# Patient Record
Sex: MALE | Race: WHITE
[De-identification: names, ages, dates, MRNs, and addresses within clinical notes are randomized per-mention and may not be internally consistent; named-entity substitution may affect disease eponyms.]

---

## 2019-03-19 ENCOUNTER — HOSPITAL ENCOUNTER (EMERGENCY)
Dept: HOSPITAL 62 - ER | Age: 49
Discharge: HOME | End: 2019-03-19
Payer: SELF-PAY

## 2019-03-19 VITALS — DIASTOLIC BLOOD PRESSURE: 87 MMHG | SYSTOLIC BLOOD PRESSURE: 186 MMHG

## 2019-03-19 DIAGNOSIS — R10.13: Primary | ICD-10-CM

## 2019-03-19 DIAGNOSIS — I49.1: ICD-10-CM

## 2019-03-19 DIAGNOSIS — D72.828: ICD-10-CM

## 2019-03-19 DIAGNOSIS — E11.65: ICD-10-CM

## 2019-03-19 DIAGNOSIS — R20.2: ICD-10-CM

## 2019-03-19 DIAGNOSIS — R10.811: ICD-10-CM

## 2019-03-19 DIAGNOSIS — R10.11: ICD-10-CM

## 2019-03-19 DIAGNOSIS — I10: ICD-10-CM

## 2019-03-19 DIAGNOSIS — R10.816: ICD-10-CM

## 2019-03-19 DIAGNOSIS — Z79.899: ICD-10-CM

## 2019-03-19 LAB
ADD MANUAL DIFF: NO
ALBUMIN SERPL-MCNC: 4.1 G/DL (ref 3.5–5)
ALP SERPL-CCNC: 68 U/L (ref 38–126)
ALT SERPL-CCNC: 27 U/L (ref 21–72)
ANION GAP SERPL CALC-SCNC: 11 MMOL/L (ref 5–19)
AST SERPL-CCNC: 21 U/L (ref 17–59)
BASOPHILS # BLD AUTO: 0.1 10^3/UL (ref 0–0.2)
BASOPHILS NFR BLD AUTO: 0.9 % (ref 0–2)
BILIRUB DIRECT SERPL-MCNC: 0.3 MG/DL (ref 0–0.4)
BILIRUB SERPL-MCNC: 0.7 MG/DL (ref 0.2–1.3)
BUN SERPL-MCNC: 20 MG/DL (ref 7–20)
CALCIUM: 10.1 MG/DL (ref 8.4–10.2)
CHLORIDE SERPL-SCNC: 99 MMOL/L (ref 98–107)
CO2 SERPL-SCNC: 27 MMOL/L (ref 22–30)
EOSINOPHIL # BLD AUTO: 0.2 10^3/UL (ref 0–0.6)
EOSINOPHIL NFR BLD AUTO: 1.7 % (ref 0–6)
ERYTHROCYTE [DISTWIDTH] IN BLOOD BY AUTOMATED COUNT: 15 % (ref 11.5–14)
GLUCOSE SERPL-MCNC: 171 MG/DL (ref 75–110)
HCT VFR BLD CALC: 40.8 % (ref 37.9–51)
HGB BLD-MCNC: 13.9 G/DL (ref 13.5–17)
LIPASE SERPL-CCNC: 200.6 U/L (ref 23–300)
LYMPHOCYTES # BLD AUTO: 3 10^3/UL (ref 0.5–4.7)
LYMPHOCYTES NFR BLD AUTO: 21.2 % (ref 13–45)
MCH RBC QN AUTO: 28.2 PG (ref 27–33.4)
MCHC RBC AUTO-ENTMCNC: 34 G/DL (ref 32–36)
MCV RBC AUTO: 83 FL (ref 80–97)
MONOCYTES # BLD AUTO: 0.9 10^3/UL (ref 0.1–1.4)
MONOCYTES NFR BLD AUTO: 6.5 % (ref 3–13)
NEUTROPHILS # BLD AUTO: 9.9 10^3/UL (ref 1.7–8.2)
NEUTS SEG NFR BLD AUTO: 69.7 % (ref 42–78)
PLATELET # BLD: 268 10^3/UL (ref 150–450)
POTASSIUM SERPL-SCNC: 4.3 MMOL/L (ref 3.6–5)
PROT SERPL-MCNC: 6.8 G/DL (ref 6.3–8.2)
RBC # BLD AUTO: 4.91 10^6/UL (ref 4.35–5.55)
SODIUM SERPL-SCNC: 136.5 MMOL/L (ref 137–145)
TOTAL CELLS COUNTED % (AUTO): 100 %
WBC # BLD AUTO: 14.2 10^3/UL (ref 4–10.5)

## 2019-03-19 PROCEDURE — S0119 ONDANSETRON 4 MG: HCPCS

## 2019-03-19 PROCEDURE — 71045 X-RAY EXAM CHEST 1 VIEW: CPT

## 2019-03-19 PROCEDURE — 84484 ASSAY OF TROPONIN QUANT: CPT

## 2019-03-19 PROCEDURE — 99284 EMERGENCY DEPT VISIT MOD MDM: CPT

## 2019-03-19 PROCEDURE — 93005 ELECTROCARDIOGRAM TRACING: CPT

## 2019-03-19 PROCEDURE — 36415 COLL VENOUS BLD VENIPUNCTURE: CPT

## 2019-03-19 PROCEDURE — 83690 ASSAY OF LIPASE: CPT

## 2019-03-19 PROCEDURE — 85025 COMPLETE CBC W/AUTO DIFF WBC: CPT

## 2019-03-19 PROCEDURE — 93010 ELECTROCARDIOGRAM REPORT: CPT

## 2019-03-19 PROCEDURE — 76705 ECHO EXAM OF ABDOMEN: CPT

## 2019-03-19 PROCEDURE — 80053 COMPREHEN METABOLIC PANEL: CPT

## 2019-03-19 NOTE — RADIOLOGY REPORT (SQ)
EXAM DESCRIPTION: 



XR CHEST 1 VIEW



COMPLETED DATE/TME:  03/19/2019 05:14



CLINICAL HISTORY: 



48 years, Male, upper abd pain, left arm tingling



Comparison: None 



FINDINGS:

No focal lung consolidation.

No pleural effusion. No pneumothorax.

Cardiac and mediastinal silhouette is unremarkable.

No acute osseous abnormality. 

Soft tissues are unremarkable. 



IMPRESSION:

No acute findings. No focal lung consolidation.

## 2019-03-19 NOTE — ER DOCUMENT REPORT
ED General





- General


TRAVEL OUTSIDE OF THE [place] IN LAST 30 DAYS: No





<SAMANTHA CLANCY - Last Filed: 03/19/19 07:06>





<JAVIER GILLETTE - Last Filed: 03/19/19 09:13>





- General


Chief Complaint: Abdominal Pain


Stated Complaint: POSS FOOD POISONING/BLOOD PRESSURE ISSUES


Time Seen by Provider: 03/19/19 05:07


Notes: 





Patient is a 48-year-old male that comes emergency department for chief co

mplaint of pains in his upper abdomen in the middle and right upper abdomen that

started around 1030 tonight, he states that he also had tingling sensations in 

his left arm, between the 2 he became concerned and came in for evaluation.  He 

denies nausea, vomiting, difficulty breathing, fever/chills.  He states that 

symptoms started shortly after he was eating at [facility] tonight.  Past 

medical history includes type 2 diabetes, hypertension, hyperlipidemia.  He 

denies smoking, recreational drugs, history of MI or CAD. (SAMANTHA CLANCY)





- Related Data


Allergies/Adverse Reactions: 


                                        





No Known Allergies Allergy (Unverified 03/19/19 04:58)


   











Past Medical History





- Social History


Smoking Status: Unknown if Ever Smoked


Family History: Reviewed & Not Pertinent





<JAVIER GILLETTE - Last Filed: 03/19/19 09:13>





- Vital signs


Vitals: 





                                        











Temp Pulse Resp BP Pulse Ox


 


 97.6 F   73   17   189/110 H  94 


 


 03/19/19 05:01  03/19/19 05:01  03/19/19 05:01  03/19/19 05:01  03/19/19 05:01














Course





- Laboratory


Result Diagrams: 


                                 03/19/19 05:37





                                 03/19/19 05:37





<SAMANTHA CLANCY - Last Filed: 03/19/19 07:06>





- Laboratory


Result Diagrams: 


                                 03/19/19 05:37





                                 03/19/19 05:37





<JAVIER GILLETTE - Last Filed: 03/19/19 09:13>





- Re-evaluation


Re-evalutation: 


Chest x-ray is unremarkable.  EKG with no acute findings, patient asymptomatic 

on my initial evaluation.  He is hypertensive but is otherwise well-appearing.  

He identifies his pain as specifically epigastric, he does have some epigastric 

tenderness along with some right upper quadrant tenderness which is minimal, 

however his examination does not show guarding or severe tenderness.  He appears

comfortable.  No tachycardia, fever, hypotension.





CBC does show some mild leukocytosis with elevation of neutrophils but no 

bandemia.  Chemistry unremarkable except for mild hyperglycemia with no 

acidosis.  Lipase is normal.  Ultrasound was performed because of the patient's 

symptoms beginning with eating and because of the painful location, this was 

normal.  Initial troponin is negative.





Discussed with patient.  On reevaluation patient states that he again felt the 

upper abdominal discomfort between my evaluations.  He did not have nausea or 

vomiting, did not have chest pain.  Because of his left arm tingling that 

occurred earlier, along with the epigastric pain, risk factors of age, 

hypertension, diabetes, I recommended a delta troponin despite his atypical 

symptoms.  Also him giving patient Pepcid and Carafate for suspected upper 

gastrointestinal tract source of the symptoms.  Patient states agreement of this

plan, he requests to leave afterwards however if the delta troponin is normal. 

(SAMANTHA CLANCY)





03/19/19 09:11


Patient with no elevation in repeat troponin.  Patient denies any chest pain 

symptoms at this time.  Patient is hypertensive at this time, although has not 

taken his normal daily medication for blood pressure management today.  Good 

return precautions discussed.  The patient has epigastric pain that is not 

suggestive of pulmonary embolus, cardiac ischemia, aortic dissection, or other 

serious etiology.  Given the extremely low risk of these diagnoses for the test 

in evaluation for these possibilities does not appear to be indicated at this 

time.  Patient has been instructed to return if the symptoms worsen or change in

any way. (JAVIER GILLETTE)





- Vital Signs


Vital signs: 





                                        











Temp Pulse Resp BP Pulse Ox


 


 97.6 F   73   20   175/95 H  94 


 


 03/19/19 05:01  03/19/19 05:01  03/19/19 07:36  03/19/19 07:36  03/19/19 07:36














- Laboratory


Laboratory results interpreted by me: 





                                        











  03/19/19 03/19/19





  05:37 05:37


 


WBC  14.2 H 


 


RDW  15.0 H 


 


Absolute Neutrophils  9.9 H 


 


Sodium   136.5 L


 


Glucose   171 H














- EKG Interpretation by Me


Additional EKG results interpreted by me: 


EKG shows sinus rhythm at a rate of 73, no T wave inversions or ST segment 

changes in consecutive leads.  WI interval of 184, QTC of 401.  There is PAC 

noted.  Left axis deviation noted. (SAMANTHA CLANCY)





Discharge





<ASTONSAMANTHA - Last Filed: 03/19/19 07:06>





<JAVIER GILLETTE - Last Filed: 03/19/19 09:13>





- Discharge


Clinical Impression: 


 Epigastric pain, Paresthesia of left arm





Condition: Stable


Disposition: HOME, SELF-CARE


Additional Instructions: 


Your ultrasound, chest x-ray, EKG, and laboratory workup did not show any 

specific concerning findings.


I suspect the cause of your upper abdominal pain is inflammation of the upper 

gastrointestinal tract (gastritis/esophagitis).  I recommend the accident 

Carafate medications as prescribed along with the nausea medication if needed, 

you can take additional Rolaids, Tums, Maalox, etc. if needed.  You can take 

Tylenol for pain. Avoid NSAIDs, alcohol, smoking, caffeine, spicy food.  Start 

with clear fluids, progress to bland diet.  





Your blood pressure was high here in the emergency department tonight, follow-up

with your primary care for additional monitoring and management of this.





Follow-up with primary care for additional evaluation and treatment including 

possible H. pylori testing.  Return if you worsen including vomiting, vomiting 

blood, black stools, severe pain, chest pain, difficulty breathing, passing out,

fever for 100.4 or greater, or any other concerning or worsening symptoms.


Prescriptions: 


Famotidine [Pepcid 20 mg Tablet] 20 mg PO BID #20 tablet


Ondansetron [Zofran Odt 4 mg Tablet] 1 - 2 tab PO Q4H PRN #15 tab.rapdis


 PRN Reason: For Nausea/Vomiting


Sucralfate [Carafate 1 gm Tablet] 1 gm PO QID #20 tablet


Forms:  Return to Work

## 2019-03-19 NOTE — EKG REPORT
SEVERITY:- ABNORMAL ECG -

SINUS RHYTHM

ATRIAL PREMATURE COMPLEX

LEFT VENTRICULAR HYPERTROPHY

:

Confirmed by: Tana Lopez 19-Mar-2019 17:04:42

## 2019-03-19 NOTE — RADIOLOGY REPORT (SQ)
EXAM DESCRIPTION: 



US ABDOMEN LIMITED



COMPLETED DATE/TME:  03/19/2019 05:14



CLINICAL HISTORY: 



48 years, Male, RUQ and epigastric pain



TECHNIQUE: Grayscale and Doppler sonogram of the right upper

quadrant abdomen.



COMPARISON: None.



FINDINGS:



Pancreas:  Not well visualized due to bowel gas.

Aorta:  Visualized portion is unremarkable.

IVC:  Visualized portion is unremarkable.



Liver:  Diffuse coarse increased echotexture most commonly

associated with hepatic steatosis. The liver is also mildly

enlarged measuring 20 cm longitudinally. No focal abnormalities. 

Main portal vein:  Normal hepatopetal flow. 

Gallbladder: No gallstones.  No gallbladder wall thickening.

Common bile duct:  0.2 cm. 



Right kidney:  12.2 cm. No hydronephrosis.  No nephrolithiasis.



IMPRESSION:



No acute sonographic abnormality.